# Patient Record
Sex: MALE | Race: WHITE | NOT HISPANIC OR LATINO | Employment: OTHER | URBAN - METROPOLITAN AREA
[De-identification: names, ages, dates, MRNs, and addresses within clinical notes are randomized per-mention and may not be internally consistent; named-entity substitution may affect disease eponyms.]

---

## 2017-01-18 ENCOUNTER — TELEPHONE (OUTPATIENT)
Dept: VASCULAR LAB | Facility: MEDICAL CENTER | Age: 82
End: 2017-01-18

## 2017-01-19 NOTE — TELEPHONE ENCOUNTER
30 day free card and number for patient assistance mailed with letter explaining what he needs to do.

## 2017-01-19 NOTE — TELEPHONE ENCOUNTER
Spoke with patient to establish care.  Patient could not afford his Xarelto.  Offered to switch him to warfarin, however he refused until he sees his cardiologist . (4-)  .Myriam Reeves, PHARMD

## 2017-01-19 NOTE — TELEPHONE ENCOUNTER
I will mail him the patient assistance information to completed with [employer].  He does not have a follow up until April

## 2017-04-07 ENCOUNTER — OFFICE VISIT (OUTPATIENT)
Dept: CARDIOLOGY | Facility: CLINIC | Age: 82
End: 2017-04-07
Payer: MEDICARE

## 2017-04-07 VITALS
OXYGEN SATURATION: 93 % | HEART RATE: 60 BPM | WEIGHT: 162 LBS | SYSTOLIC BLOOD PRESSURE: 120 MMHG | DIASTOLIC BLOOD PRESSURE: 60 MMHG | HEIGHT: 68 IN | BODY MASS INDEX: 24.55 KG/M2

## 2017-04-07 DIAGNOSIS — Z95.0 CARDIAC PACEMAKER IN SITU: ICD-10-CM

## 2017-04-07 DIAGNOSIS — I20.9 ISCHEMIC CHEST PAIN (HCC): Chronic | ICD-10-CM

## 2017-04-07 DIAGNOSIS — R06.02 SHORTNESS OF BREATH: Chronic | ICD-10-CM

## 2017-04-07 DIAGNOSIS — I49.5 TACHY-BRADY SYNDROME (HCC): Chronic | ICD-10-CM

## 2017-04-07 DIAGNOSIS — I48.0 PAROXYSMAL ATRIAL FIBRILLATION (HCC): Chronic | ICD-10-CM

## 2017-04-07 PROCEDURE — G8420 CALC BMI NORM PARAMETERS: HCPCS | Performed by: INTERNAL MEDICINE

## 2017-04-07 PROCEDURE — G8432 DEP SCR NOT DOC, RNG: HCPCS | Performed by: INTERNAL MEDICINE

## 2017-04-07 PROCEDURE — 1036F TOBACCO NON-USER: CPT | Performed by: INTERNAL MEDICINE

## 2017-04-07 PROCEDURE — 99214 OFFICE O/P EST MOD 30 MIN: CPT | Performed by: INTERNAL MEDICINE

## 2017-04-07 PROCEDURE — 4040F PNEUMOC VAC/ADMIN/RCVD: CPT | Mod: 8P | Performed by: INTERNAL MEDICINE

## 2017-04-07 PROCEDURE — 1101F PT FALLS ASSESS-DOCD LE1/YR: CPT | Mod: 8P | Performed by: INTERNAL MEDICINE

## 2017-04-07 ASSESSMENT — ENCOUNTER SYMPTOMS
WHEEZING: 0
WEAKNESS: 0
ORTHOPNEA: 0
FEVER: 0
BRUISES/BLEEDS EASILY: 0
SORE THROAT: 0
LOSS OF CONSCIOUSNESS: 0
PND: 0
CLAUDICATION: 0
SHORTNESS OF BREATH: 0
CHILLS: 0
PALPITATIONS: 0
COUGH: 0
CARDIOVASCULAR NEGATIVE: 1
GASTROINTESTINAL NEGATIVE: 1
STRIDOR: 0
SPUTUM PRODUCTION: 0
NEUROLOGICAL NEGATIVE: 1
DIZZINESS: 0
CONSTITUTIONAL NEGATIVE: 1
EYES NEGATIVE: 1
HEMOPTYSIS: 0
MUSCULOSKELETAL NEGATIVE: 1
RESPIRATORY NEGATIVE: 1

## 2017-04-07 NOTE — PROGRESS NOTES
Subjective:   Jesus Mccarthy is a 85 y.o. male who presents today for his PPM and paroxysmal a-fib.  He is doing well with no symptoms of his atrial fibrillation.  His PPM is not due to be interrogated.  He could not afford his rivaroxiban and does not want to be on this or any other agent.    Past Medical History   Diagnosis Date   • Tachy-casandra syndrome (CMS-HCC)    • Paroxysmal atrial fibrillation (CMS-HCC)    • Shortness of breath    • Chest pain, unspecified      History reviewed. No pertinent past surgical history.  History reviewed. No pertinent family history.  History   Smoking status   • Former Smoker   Smokeless tobacco   • Never Used     No Known Allergies  Outpatient Encounter Prescriptions as of 4/7/2017   Medication Sig Dispense Refill   • rivaroxaban (XARELTO) 20 MG Tab tablet Take 1 Tab by mouth with dinner. 30 Tab 11   • metoprolol (LOPRESSOR) 25 MG Tab Take 1 Tab by mouth 2 times a day. 180 Tab 3   • tramadol (ULTRAM) 50 MG TABS Take  mg by mouth every four hours as needed.     • albuterol (VENTOLIN OR PROVENTIL) 108 (90 BASE) MCG/ACT AERS inhalation aerosol Inhale 2 Puffs by mouth every 6 hours as needed.     • FLUNISOLIDE INH Inhale  by mouth.     • flunisolide (NASALIDE) 25 MCG/ACT (0.025%) SOLN Spray 2 Sprays in nose 2 Times a Day.     • dutasteride (AVODART) 0.5 MG capsule Take 0.5 mg by mouth every day.     • tamsulosin (FLOMAX) 0.4 MG capsule Take 0.4 mg by mouth ONE-HALF HOUR AFTER BREAKFAST.         No facility-administered encounter medications on file as of 4/7/2017.     Review of Systems   Constitutional: Negative.  Negative for fever, chills and malaise/fatigue.   HENT: Negative.  Negative for sore throat.    Eyes: Negative.    Respiratory: Negative.  Negative for cough, hemoptysis, sputum production, shortness of breath, wheezing and stridor.    Cardiovascular: Negative.  Negative for chest pain, palpitations, orthopnea, claudication, leg swelling and PND.   Gastrointestinal:  "Negative.    Genitourinary: Negative.    Musculoskeletal: Negative.    Skin: Negative.    Neurological: Negative.  Negative for dizziness, loss of consciousness and weakness.   Endo/Heme/Allergies: Negative.  Does not bruise/bleed easily.   All other systems reviewed and are negative.       Objective:   /60 mmHg  Pulse 60  Ht 1.727 m (5' 8\")  Wt 73.483 kg (162 lb)  BMI 24.64 kg/m2  SpO2 93%    Physical Exam   Constitutional: He is oriented to person, place, and time. He appears well-developed and well-nourished. No distress.   HENT:   Head: Normocephalic.   Mouth/Throat: Oropharynx is clear and moist.   Eyes: EOM are normal. Pupils are equal, round, and reactive to light. Right eye exhibits no discharge. Left eye exhibits no discharge. No scleral icterus.   Neck: Normal range of motion. Neck supple. No JVD present. No tracheal deviation present.   Cardiovascular: Normal rate, regular rhythm, S1 normal, S2 normal, normal heart sounds, intact distal pulses and normal pulses.  Exam reveals no gallop, no S3, no S4 and no friction rub.    No murmur heard.   No systolic murmur is present    No diastolic murmur is present   Pulses:       Carotid pulses are 2+ on the right side, and 2+ on the left side.       Radial pulses are 2+ on the right side, and 2+ on the left side.        Dorsalis pedis pulses are 2+ on the right side, and 2+ on the left side.        Posterior tibial pulses are 2+ on the right side, and 2+ on the left side.   Pulmonary/Chest: Effort normal and breath sounds normal. No respiratory distress. He has no wheezes. He has no rales.   Abdominal: Soft. Bowel sounds are normal. He exhibits no distension and no mass. There is no tenderness. There is no rebound and no guarding.   Musculoskeletal: He exhibits no edema.   Neurological: He is alert and oriented to person, place, and time. No cranial nerve deficit.   Skin: Skin is warm and dry. He is not diaphoretic. No pallor.   Psychiatric: He has a " normal mood and affect. His behavior is normal. Judgment and thought content normal.   Nursing note and vitals reviewed.      Assessment:     1. Tachy-casandra syndrome (CMS-HCC)     2. Paroxysmal atrial fibrillation (CMS-HCC)     3. Shortness of breath     4. Ischemic chest pain (CMS-HCC)     5. Cardiac pacemaker in situ         Medical Decision Making:  Today's Assessment / Status / Plan:     84 y/o M with PPM and tachy casandra with some a-fib.  We will see him back in 6 months.  We will keep him on the same medications today with no changes.    Thank for you allowing me to take part in your patient's care, please call should you have any questions or would like to discuss this patient.

## 2017-04-07 NOTE — Clinical Note
[facility] [place]   [address] [place] [address]  Phone: [phone]  Fax: [phone]              Jesus Mccarthy  6/16/1931    Encounter Date: 4/7/2017    Ren Ruth M.D.          PROGRESS NOTE:  Subjective:   Jesus Mccarthy is a 85 y.o. male who presents today for his PPM and paroxysmal a-fib.  He is doing well with no symptoms of his atrial fibrillation.  His PPM is not due to be interrogated.  He could not afford his rivaroxiban and does not want to be on this or any other agent.    Past Medical History   Diagnosis Date   • Tachy-casandra syndrome (CMS-HCC)    • Paroxysmal atrial fibrillation (CMS-HCC)    • Shortness of breath    • Chest pain, unspecified      History reviewed. No pertinent past surgical history.  History reviewed. No pertinent family history.  History   Smoking status   • Former Smoker   Smokeless tobacco   • Never Used     No Known Allergies  Outpatient Encounter Prescriptions as of 4/7/2017   Medication Sig Dispense Refill   • rivaroxaban (XARELTO) 20 MG Tab tablet Take 1 Tab by mouth with dinner. 30 Tab 11   • metoprolol (LOPRESSOR) 25 MG Tab Take 1 Tab by mouth 2 times a day. 180 Tab 3   • tramadol (ULTRAM) 50 MG TABS Take  mg by mouth every four hours as needed.     • albuterol (VENTOLIN OR PROVENTIL) 108 (90 BASE) MCG/ACT AERS inhalation aerosol Inhale 2 Puffs by mouth every 6 hours as needed.     • FLUNISOLIDE INH Inhale  by mouth.     • flunisolide (NASALIDE) 25 MCG/ACT (0.025%) SOLN Spray 2 Sprays in nose 2 Times a Day.     • dutasteride (AVODART) 0.5 MG capsule Take 0.5 mg by mouth every day.     • tamsulosin (FLOMAX) 0.4 MG capsule Take 0.4 mg by mouth ONE-HALF HOUR AFTER BREAKFAST.         No facility-administered encounter medications on file as of 4/7/2017.     Review of Systems   Constitutional: Negative.  Negative for fever, chills and malaise/fatigue.   HENT: Negative.  Negative for sore throat.    Eyes:  "Negative.    Respiratory: Negative.  Negative for cough, hemoptysis, sputum production, shortness of breath, wheezing and stridor.    Cardiovascular: Negative.  Negative for chest pain, palpitations, orthopnea, claudication, leg swelling and PND.   Gastrointestinal: Negative.    Genitourinary: Negative.    Musculoskeletal: Negative.    Skin: Negative.    Neurological: Negative.  Negative for dizziness, loss of consciousness and weakness.   Endo/Heme/Allergies: Negative.  Does not bruise/bleed easily.   All other systems reviewed and are negative.       Objective:   /60 mmHg  Pulse 60  Ht 1.727 m (5' 8\")  Wt 73.483 kg (162 lb)  BMI 24.64 kg/m2  SpO2 93%    Physical Exam   Constitutional: He is oriented to person, place, and time. He appears well-developed and well-nourished. No distress.   HENT:   Head: Normocephalic.   Mouth/Throat: Oropharynx is clear and moist.   Eyes: EOM are normal. Pupils are equal, round, and reactive to light. Right eye exhibits no discharge. Left eye exhibits no discharge. No scleral icterus.   Neck: Normal range of motion. Neck supple. No JVD present. No tracheal deviation present.   Cardiovascular: Normal rate, regular rhythm, S1 normal, S2 normal, normal heart sounds, intact distal pulses and normal pulses.  Exam reveals no gallop, no S3, no S4 and no friction rub.    No murmur heard.   No systolic murmur is present    No diastolic murmur is present   Pulses:       Carotid pulses are 2+ on the right side, and 2+ on the left side.       Radial pulses are 2+ on the right side, and 2+ on the left side.        Dorsalis pedis pulses are 2+ on the right side, and 2+ on the left side.        Posterior tibial pulses are 2+ on the right side, and 2+ on the left side.   Pulmonary/Chest: Effort normal and breath sounds normal. No respiratory distress. He has no wheezes. He has no rales.   Abdominal: Soft. Bowel sounds are normal. He exhibits no distension and no mass. There is no " tenderness. There is no rebound and no guarding.   Musculoskeletal: He exhibits no edema.   Neurological: He is alert and oriented to person, place, and time. No cranial nerve deficit.   Skin: Skin is warm and dry. He is not diaphoretic. No pallor.   Psychiatric: He has a normal mood and affect. His behavior is normal. Judgment and thought content normal.   Nursing note and vitals reviewed.      Assessment:     1. Tachy-casandra syndrome (CMS-HCC)     2. Paroxysmal atrial fibrillation (CMS-HCC)     3. Shortness of breath     4. Ischemic chest pain (CMS-HCC)     5. Cardiac pacemaker in situ         Medical Decision Making:  Today's Assessment / Status / Plan:     86 y/o M with PPM and tachy casandra with some a-fib.  We will see him back in 6 months.  We will keep him on the same medications today with no changes.    Thank for you allowing me to take part in your patient's care, please call should you have any questions or would like to discuss this patient.      Meir Harkins M.D.  [address] [place] [id number]  VIA Facsimile: [phone]

## 2017-09-29 ENCOUNTER — DEVICE CHECK (OUTPATIENT)
Dept: CARDIOLOGY | Facility: MEDICAL CENTER | Age: 82
End: 2017-09-29

## 2017-09-29 NOTE — PROGRESS NOTES
Type of monitoring: TTM/Phone Pacemaker     : [employer]    Date of Transmission: 9/29/17    Type of device: Pacemaker    Mode: DDDR    Rate: 60 ppm  Magnet Rate: 100 ppm      Device demonstrated appropriate function.

## 2018-01-23 ENCOUNTER — TELEPHONE (OUTPATIENT)
Dept: CARDIOLOGY | Facility: MEDICAL CENTER | Age: 83
End: 2018-01-23

## 2023-02-16 PROBLEM — S02.5XXD CLOSED FRACTURE OF TOOTH WITH ROUTINE HEALING: Status: ACTIVE | Noted: 2023-02-16

## 2023-02-16 PROBLEM — F03.A0 MILD DEMENTIA WITHOUT BEHAVIORAL DISTURBANCE, PSYCHOTIC DISTURBANCE, MOOD DISTURBANCE, OR ANXIETY (HCC): Status: ACTIVE | Noted: 2023-02-16

## 2023-02-16 PROBLEM — M25.562 ACUTE PAIN OF LEFT KNEE: Status: ACTIVE | Noted: 2023-02-16

## 2023-02-16 PROBLEM — I48.20 CHRONIC ATRIAL FIBRILLATION (HCC): Status: ACTIVE | Noted: 2023-02-16

## 2023-02-17 PROBLEM — Z91.81 AT HIGH RISK FOR FALLS: Status: ACTIVE | Noted: 2023-02-17

## 2023-02-17 PROBLEM — N40.0 BENIGN PROSTATIC HYPERPLASIA WITHOUT LOWER URINARY TRACT SYMPTOMS: Status: ACTIVE | Noted: 2023-02-17

## 2023-04-28 PROBLEM — D71: Status: ACTIVE | Noted: 2023-04-28

## 2023-04-28 PROBLEM — S81.802A AVULSION OF SKIN OF LOWER LEG, LEFT, INITIAL ENCOUNTER: Status: ACTIVE | Noted: 2023-04-28

## 2023-04-28 PROBLEM — R29.898 WEAKNESS OF BOTH LEGS: Status: ACTIVE | Noted: 2023-04-28

## 2023-04-28 PROBLEM — L03.116 CELLULITIS OF LEFT LOWER EXTREMITY: Status: ACTIVE | Noted: 2023-04-28

## 2023-04-28 PROBLEM — R60.0 LOCALIZED EDEMA: Status: ACTIVE | Noted: 2023-04-28

## 2023-04-28 PROBLEM — J84.89: Status: ACTIVE | Noted: 2023-04-28

## 2023-05-21 PROBLEM — R91.1 LUNG NODULE: Status: ACTIVE | Noted: 2023-05-19

## 2023-05-21 PROBLEM — M25.512 CHRONIC LEFT SHOULDER PAIN: Status: ACTIVE | Noted: 2023-05-19

## 2023-05-21 PROBLEM — G89.29 CHRONIC LEFT SHOULDER PAIN: Status: ACTIVE | Noted: 2023-05-19

## 2023-05-21 PROBLEM — L30.9 DERMATITIS: Status: ACTIVE | Noted: 2023-05-19

## 2023-06-02 PROBLEM — E53.8 VITAMIN B12 DEFICIENCY: Status: ACTIVE | Noted: 2023-06-02

## 2023-06-03 PROBLEM — I73.9 PERIPHERAL VASCULAR DISEASE (HCC): Status: ACTIVE | Noted: 2023-06-02

## 2023-07-14 PROBLEM — D63.8 ANEMIA OF CHRONIC DISEASE: Status: ACTIVE | Noted: 2023-07-13

## 2023-07-14 PROBLEM — S81.802A AVULSION OF SKIN OF LOWER LEG, LEFT, INITIAL ENCOUNTER: Status: RESOLVED | Noted: 2023-04-28 | Resolved: 2023-07-14

## 2023-07-14 PROBLEM — L03.116 CELLULITIS OF LEFT LOWER EXTREMITY: Status: RESOLVED | Noted: 2023-04-28 | Resolved: 2023-07-14

## 2023-08-25 PROBLEM — I50.9 PLEURAL EFFUSION DUE TO CHF (CONGESTIVE HEART FAILURE) (HCC): Status: ACTIVE | Noted: 2023-08-25

## 2023-08-25 PROBLEM — I50.22 CHRONIC SYSTOLIC HEART FAILURE (HCC): Status: ACTIVE | Noted: 2023-08-25

## 2023-10-13 PROBLEM — R29.3 ABNORMAL POSTURE: Status: ACTIVE | Noted: 2023-10-13

## 2023-10-25 PROBLEM — I10 PRIMARY HYPERTENSION: Status: ACTIVE | Noted: 2023-10-25

## 2023-10-25 PROBLEM — I73.9 PERIPHERAL VASCULAR DISEASE (HCC): Status: RESOLVED | Noted: 2023-06-02 | Resolved: 2023-10-25

## 2023-10-25 PROBLEM — D68.69 SECONDARY HYPERCOAGULABLE STATE (HCC): Status: ACTIVE | Noted: 2023-10-25

## 2024-03-21 ENCOUNTER — HOSPITAL ENCOUNTER (OUTPATIENT)
Facility: MEDICAL CENTER | Age: 89
End: 2024-03-21
Attending: NURSE PRACTITIONER
Payer: MEDICARE

## 2024-04-09 ENCOUNTER — HOSPITAL ENCOUNTER (OUTPATIENT)
Facility: MEDICAL CENTER | Age: 89
End: 2024-04-09
Attending: NURSE PRACTITIONER
Payer: MEDICARE

## 2024-04-09 DIAGNOSIS — Z20.1 HISTORY OF EXPOSURE TO TUBERCULOSIS: ICD-10-CM

## 2024-05-17 PROBLEM — E44.1 PROTEIN-CALORIE MALNUTRITION, MILD (HCC): Status: ACTIVE | Noted: 2024-05-17

## 2024-05-17 PROBLEM — N17.9 ACUTE KIDNEY INJURY SUPERIMPOSED ON CKD (HCC): Status: ACTIVE | Noted: 2024-05-17

## 2024-05-17 PROBLEM — N18.9 ACUTE KIDNEY INJURY SUPERIMPOSED ON CKD (HCC): Status: ACTIVE | Noted: 2024-05-17

## 2024-09-12 PROBLEM — R06.82 TACHYPNEA: Status: ACTIVE | Noted: 2024-09-12

## 2024-10-10 PROBLEM — L85.3 DRY SKIN: Status: ACTIVE | Noted: 2024-10-10

## 2025-01-13 PROBLEM — N18.30 STAGE 3 CHRONIC KIDNEY DISEASE: Status: ACTIVE | Noted: 2025-01-13

## 2025-04-14 PROBLEM — H91.93 BILATERAL HEARING LOSS: Status: ACTIVE | Noted: 2025-04-14

## 2025-04-14 PROBLEM — Z97.4 DOES USE HEARING AID: Status: ACTIVE | Noted: 2025-04-14

## 2025-07-18 ENCOUNTER — HOSPITAL ENCOUNTER (OUTPATIENT)
Facility: MEDICAL CENTER | Age: OVER 89
End: 2025-07-18
Payer: MEDICARE

## 2025-07-18 DIAGNOSIS — R39.9 UTI SYMPTOMS: ICD-10-CM

## 2025-07-18 LAB
APPEARANCE UR: CLEAR
BILIRUB UR QL STRIP.AUTO: NEGATIVE
COLOR UR: YELLOW
GLUCOSE UR STRIP.AUTO-MCNC: NEGATIVE MG/DL
KETONES UR STRIP.AUTO-MCNC: NEGATIVE MG/DL
LEUKOCYTE ESTERASE UR QL STRIP.AUTO: NEGATIVE
MICRO URNS: NORMAL
NITRITE UR QL STRIP.AUTO: NEGATIVE
PH UR STRIP.AUTO: 5.5 [PH] (ref 5–8)
PROT UR QL STRIP: NEGATIVE MG/DL
RBC UR QL AUTO: NEGATIVE
SP GR UR STRIP.AUTO: 1.01
UROBILINOGEN UR STRIP.AUTO-MCNC: 0.2 EU/DL

## 2025-07-18 PROCEDURE — 81003 URINALYSIS AUTO W/O SCOPE: CPT
